# Patient Record
(demographics unavailable — no encounter records)

---

## 2025-02-26 NOTE — ASSESSMENT
[FreeTextEntry1] : discussed diagnosis; offered her PT, but would rather do exercises at home. Continue with heat and ice and aleve. We will watch it for 2 weeks; discussed EMGs if symptoms persist.

## 2025-02-26 NOTE — HISTORY OF PRESENT ILLNESS
[Right Arm] : right arm [Sudden] : sudden [8] : 8 [3] : 3 [Dull/Aching] : dull/aching [Radiating] : radiating [Sharp] : sharp [Intermittent] : intermittent [Household chores] : household chores [Leisure] : leisure [Rest] : rest [Exercising] : exercising [Part time] : Work status: part time [] : Post Surgical Visit: no [FreeTextEntry7] : R Forearm  [de-identified] : 2/5/2024 [de-identified] : Dr. Terrell  [de-identified] :

## 2025-02-26 NOTE — REASON FOR VISIT
[FreeTextEntry2] : Patient is a 70 year old RHD female who fell 2 weeks ago reinjuring her R Elbow. Pain radiates to her R Hand. Patient complains of R Hand numbness, but not constant. Takes aleve.

## 2025-02-26 NOTE — PHYSICAL EXAM
[Right] : right elbow [There are no fractures, subluxations or dislocations. No significant abnormalities are seen] : There are no fractures, subluxations or dislocations. No significant abnormalities are seen [Degenerative change] : Degenerative change [NL (150)] : flexion 150 degrees [NL (0)] : extension 0 degrees [] : light touch intact